# Patient Record
Sex: FEMALE | Race: WHITE | NOT HISPANIC OR LATINO | Employment: FULL TIME | URBAN - METROPOLITAN AREA
[De-identification: names, ages, dates, MRNs, and addresses within clinical notes are randomized per-mention and may not be internally consistent; named-entity substitution may affect disease eponyms.]

---

## 2019-07-09 ENCOUNTER — APPOINTMENT (OUTPATIENT)
Dept: RADIOLOGY | Facility: CLINIC | Age: 44
End: 2019-07-09
Payer: COMMERCIAL

## 2019-07-09 ENCOUNTER — OFFICE VISIT (OUTPATIENT)
Dept: URGENT CARE | Facility: CLINIC | Age: 44
End: 2019-07-09
Payer: COMMERCIAL

## 2019-07-09 VITALS — TEMPERATURE: 99.1 F | SYSTOLIC BLOOD PRESSURE: 162 MMHG | DIASTOLIC BLOOD PRESSURE: 90 MMHG

## 2019-07-09 DIAGNOSIS — S99.922A INJURY OF LEFT FOOT, INITIAL ENCOUNTER: ICD-10-CM

## 2019-07-09 DIAGNOSIS — S92.502A CLOSED FRACTURE OF PHALANX OF LEFT THIRD TOE, INITIAL ENCOUNTER: Primary | ICD-10-CM

## 2019-07-09 PROCEDURE — 99213 OFFICE O/P EST LOW 20 MIN: CPT | Performed by: PHYSICIAN ASSISTANT

## 2019-07-09 PROCEDURE — 73630 X-RAY EXAM OF FOOT: CPT

## 2019-07-09 NOTE — PROGRESS NOTES
[address] [facility] [address] [facility]    NAME: Linda James is a 38 y o  female  : 1975    MRN: [id number]  DATE: 2019  TIME: 6:39 PM    Assessment and Plan   Closed fracture of phalanx of left third toe, initial encounter [S92 [id number]]  1  Closed fracture of phalanx of left third toe, initial encounter  XR foot 3+ vw left    Ambulatory referral to Orthopedic Surgery    Post Op Shoe     X-ray left foot:   Oblique, displaced fracture proximal phalanx of 3rd toe left foot  Office staff to help with orthopedic referral   Patient has crutches and cane at home and may use as needed  May also buddy tape  Surgical shoe applied by nursing staff  Information given to pt regarding local PCP offices  Patient Instructions   Patient Instructions   Rest injured body part  Ice 10-15 minutes off and on every 2-3 hours while awake for 48 hours after injury  After 48 hours you may start using warm compresses if appropriate  Sturdy soled shoe  Do not go barefoot  Elevate injured body part as able to help decrease pain and swelling  Stop any ibuprofen products as may contribute to your elevated blood pressure  May safely take extra-strength Tylenol as needed for pain  Follow up with Orthopedist           Chief Complaint     Chief Complaint   Patient presents with    Foot Injury     Left foot became injured while walking in a river barefoot when foot became stuck between rock and driftwood       History of Present Illness   Linda James presents to the clinic c/o  50-year-old female with pain swelling contusion left foot  Was hiking in [facility] [place] yesterday and got her foot caught with some drip would and rocks  Hurts to walk on  Feels better if she is wearing a shoe  She was with a medical technician who had her elevate the foot and ice it  She believe she was also given an ibuprofen  Denies prior history of injury to that foot        Review of Systems   Review of Systems Constitutional: Positive for activity change  Negative for appetite change, chills and fever  Respiratory: Negative  Cardiovascular: Negative  Musculoskeletal: Positive for arthralgias, gait problem and joint swelling  Skin: Positive for color change  Current Medications     No long-term medications on file  Current Allergies     Allergies as of 07/09/2019    (No Known Allergies)          The following portions of the patient's history were reviewed and updated as appropriate: allergies, current medications, past family history, past medical history, past social history, past surgical history and problem list   History reviewed  No pertinent past medical history  History reviewed  No pertinent surgical history  History reviewed  No pertinent family history  Objective   /90   Temp 99 1 °F (37 3 °C) (Tympanic Core)   No LMP recorded  Physical Exam     Physical Exam   Constitutional: She is oriented to person, place, and time  She appears well-developed and well-nourished  No distress  Musculoskeletal: She exhibits edema and tenderness  Left foot: There is decreased range of motion, tenderness, bony tenderness and swelling  There is normal capillary refill, no crepitus, no deformity and no laceration  Feet:    Swelling, TTP over the 3rd 4th metatarsal bases and proximal phalanxes  Neurological: She is alert and oriented to person, place, and time  Skin: She is not diaphoretic  Contusion dorsal aspect of left foot near   Psychiatric: She has a normal mood and affect  Nursing note and vitals reviewed

## 2019-07-09 NOTE — PATIENT INSTRUCTIONS
Rest injured body part  Ice 10-15 minutes off and on every 2-3 hours while awake for 48 hours after injury  After 48 hours you may start using warm compresses if appropriate  Sturdy soled shoe  Do not go barefoot  Elevate injured body part as able to help decrease pain and swelling  Stop any ibuprofen products as may contribute to your elevated blood pressure  May safely take extra-strength Tylenol as needed for pain      Follow up with Orthopedist

## 2019-07-12 ENCOUNTER — OFFICE VISIT (OUTPATIENT)
Dept: OBGYN CLINIC | Facility: MEDICAL CENTER | Age: 44
End: 2019-07-12
Payer: COMMERCIAL

## 2019-07-12 ENCOUNTER — APPOINTMENT (OUTPATIENT)
Dept: RADIOLOGY | Facility: CLINIC | Age: 44
End: 2019-07-12
Payer: COMMERCIAL

## 2019-07-12 VITALS
HEIGHT: 61 IN | SYSTOLIC BLOOD PRESSURE: 153 MMHG | DIASTOLIC BLOOD PRESSURE: 90 MMHG | HEART RATE: 98 BPM | BODY MASS INDEX: 29.11 KG/M2 | WEIGHT: 154.2 LBS

## 2019-07-12 DIAGNOSIS — S92.502A CLOSED FRACTURE OF PHALANX OF LEFT THIRD TOE, INITIAL ENCOUNTER: ICD-10-CM

## 2019-07-12 PROCEDURE — 99203 OFFICE O/P NEW LOW 30 MIN: CPT | Performed by: EMERGENCY MEDICINE

## 2019-07-12 PROCEDURE — 73630 X-RAY EXAM OF FOOT: CPT

## 2019-07-12 NOTE — PROGRESS NOTES
Assessment/Plan:    Diagnoses and all orders for this visit:    Closed fracture of phalanx of left third toe, initial encounter  -     Ambulatory referral to Orthopedic Surgery  -     XR foot 3+ vw left; Future     stable fracture of the 3rd proximal phalanx  Patient prefers to wear stiff soled shoe sandals  Follow-up in approximately 2 weeks continue ice and Advil  Return in about 2 weeks (around 7/26/2019)  Chief Complaint:     Chief Complaint   Patient presents with    Left Foot - Pain       Subjective:   Patient ID: Prince Carrillo is a 38 y o  female  New patient presents for left foot injury occurring while she was [place] recre[place] she states she tripped and her foot got stuck between either would or some rocks  She was evaluated with x-rays which revealed a slightly displaced fracture of the 3rd digit  She was given a postop shoe but finds that using her sandals is more comfortable  Review of Systems   Constitutional: Negative for fever  Respiratory: Negative for shortness of breath  Cardiovascular: Negative for chest pain  Gastrointestinal: Negative for abdominal pain  Genitourinary: Negative for difficulty urinating  Musculoskeletal: Positive for arthralgias and joint swelling  Skin: Negative for rash  Neurological: Negative for weakness  Psychiatric/Behavioral: Negative for suicidal ideas  The following portions of the patient's chart were reviewed and updated as appropriate: Allergy:  No Known Allergies    History reviewed  No pertinent past medical history      Past Surgical History:   Procedure Laterality Date    HIP SURGERY Left     hip dislocation       Social History     Socioeconomic History    Marital status: Single     Spouse name: Not on file    Number of children: Not on file    Years of education: Not on file    Highest education level: Not on file   Occupational History    Not on file   Social Needs    Financial resource strain: Not on file    Food insecurity:     Worry: Not on file     Inability: Not on file    Transportation needs:     Medical: Not on file     Non-medical: Not on file   Tobacco Use    Smoking status: Current Every Day Smoker     Types: Cigarettes    Smokeless tobacco: Never Used   Substance and Sexual Activity    Alcohol use: Not on file    Drug use: Not on file    Sexual activity: Not on file   Lifestyle    Physical activity:     Days per week: Not on file     Minutes per session: Not on file    Stress: Not on file   Relationships    Social connections:     Talks on phone: Not on file     Gets together: Not on file     Attends Muslim service: Not on file     Active member of club or organization: Not on file     Attends meetings of clubs or organizations: Not on file     Relationship status: Not on file    Intimate partner violence:     Fear of current or ex partner: Not on file     Emotionally abused: Not on file     Physically abused: Not on file     Forced sexual activity: Not on file   Other Topics Concern    Not on file   Social History Narrative    Not on file       Family History   Problem Relation Age of Onset    Hypertension Mother     Hypertension Father        Medications:  No current outpatient medications on file  There is no problem list on file for this patient  Objective:  Left Ankle Exam     Other   Erythema: absent  Sensation: normal  Pulse: present    Comments: There is mild diffuse swelling and ecchymosis of the mid and forefoot  Lisfranc and tarsal metatarsal joints are nontender  There is tenderness of the 2nd and 3rd digits  Skin is intact there is ecchymosis of the toes  Physical Exam      Neurologic Exam    Procedures    I have personally reviewed pertinent films in PACS

## 2019-07-27 ENCOUNTER — OFFICE VISIT (OUTPATIENT)
Dept: OBGYN CLINIC | Facility: MEDICAL CENTER | Age: 44
End: 2019-07-27
Payer: COMMERCIAL

## 2019-07-27 VITALS
DIASTOLIC BLOOD PRESSURE: 79 MMHG | HEIGHT: 61 IN | WEIGHT: 154.4 LBS | BODY MASS INDEX: 29.15 KG/M2 | HEART RATE: 96 BPM | SYSTOLIC BLOOD PRESSURE: 136 MMHG

## 2019-07-27 DIAGNOSIS — S92.502D CLOSED FRACTURE OF PHALANX OF LEFT THIRD TOE WITH ROUTINE HEALING, SUBSEQUENT ENCOUNTER: Primary | ICD-10-CM

## 2019-07-27 PROCEDURE — 99213 OFFICE O/P EST LOW 20 MIN: CPT | Performed by: EMERGENCY MEDICINE

## 2019-07-27 NOTE — PROGRESS NOTES
Assessment/Plan:    Diagnoses and all orders for this visit:    Closed fracture of phalanx of left third toe with routine healing, subsequent encounter    Patient is improving declines x-rays today  She will be working next week in his a  on her feet 3 days in a row  She states the postop she was uncomfortable to wear  I have informed her that she is well on course and is normal to have pain 3 weeks out  We will see her back in 2 weeks if she is still having significant pain  May continue over-the-counter medications for pain  Return if symptoms worsen or fail to improve  Chief Complaint:     Chief Complaint   Patient presents with    Left Foot - Follow-up       Subjective:   Patient ID: Flor Cohen is a 38 y o  female  Patient returns with improving symptoms of the left 3rd toe  She has been using her orthotics sandals  She has noticed waxing waning symptoms there are periods with no pain  She also noticed some pain on the ball of her foot  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 which may be exacerbating her pain  Initial note:  New patient presents for left foot injury occurring [place] she was [place] she states she tripped and her foot got stuck between either would or some rocks  She was evaluated with x-rays which revealed a slightly displaced fracture of the 3rd digit  She was given a postop shoe but finds that using her sandals is more comfortable  Review of Systems    The following portions of the patient's chart were reviewed and updated as appropriate: Allergy:  No Known Allergies    History reviewed  No pertinent past medical history      Past Surgical History:   Procedure Laterality Date    HIP SURGERY Left     hip dislocation       Social History     Socioeconomic History    Marital status: Single     Spouse name: Not on file    Number of children: Not on file    Years of education: Not on file    Highest education level: Not on file   Occupational History    Not on file   Social Needs    Financial resource strain: Not on file    Food insecurity:     Worry: Not on file     Inability: Not on file    Transportation needs:     Medical: Not on file     Non-medical: Not on file   Tobacco Use    Smoking status: Current Every Day Smoker     Types: Cigarettes    Smokeless tobacco: Never Used   Substance and Sexual Activity    Alcohol use: Not on file    Drug use: Not on file    Sexual activity: Not on file   Lifestyle    Physical activity:     Days per week: Not on file     Minutes per session: Not on file    Stress: Not on file   Relationships    Social connections:     Talks on phone: Not on file     Gets together: Not on file     Attends Orthodox service: Not on file     Active member of club or organization: Not on file     Attends meetings of clubs or organizations: Not on file     Relationship status: Not on file    Intimate partner violence:     Fear of current or ex partner: Not on file     Emotionally abused: Not on file     Physically abused: Not on file     Forced sexual activity: Not on file   Other Topics Concern    Not on file   Social History Narrative    Not on file       Family History   Problem Relation Age of Onset    Hypertension Mother     Hypertension Father        Medications:  No current outpatient medications on file  There is no problem list on file for this patient  Objective:  Left Ankle Exam     Other   Erythema: absent  Sensation: normal  Pulse: present    Comments:  Third digit with mild swelling and tenderness to palpation of the proximal phalanx  Otherwise foot is nontender normal inspection no signs of inflammation or erythema            Physical Exam      Neurologic Exam    Procedures    I have personally reviewed the written report of the pertinent studies